# Patient Record
Sex: MALE | Race: BLACK OR AFRICAN AMERICAN | Employment: FULL TIME | URBAN - METROPOLITAN AREA
[De-identification: names, ages, dates, MRNs, and addresses within clinical notes are randomized per-mention and may not be internally consistent; named-entity substitution may affect disease eponyms.]

---

## 2024-04-30 ENCOUNTER — APPOINTMENT (OUTPATIENT)
Dept: GENERAL RADIOLOGY | Age: 39
End: 2024-04-30
Payer: MEDICAID

## 2024-04-30 ENCOUNTER — HOSPITAL ENCOUNTER (EMERGENCY)
Age: 39
Discharge: HOME | End: 2024-04-30
Attending: EMERGENCY MEDICINE
Payer: MEDICAID

## 2024-04-30 VITALS
HEART RATE: 73 BPM | SYSTOLIC BLOOD PRESSURE: 114 MMHG | RESPIRATION RATE: 18 BRPM | OXYGEN SATURATION: 97 % | DIASTOLIC BLOOD PRESSURE: 73 MMHG | TEMPERATURE: 98 F | WEIGHT: 190.92 LBS

## 2024-04-30 DIAGNOSIS — J02.0 STREP PHARYNGITIS: Primary | ICD-10-CM

## 2024-04-30 LAB
SPECIMEN SOURCE: ABNORMAL
STREP A, MOLECULAR: POSITIVE

## 2024-04-30 PROCEDURE — 71046 X-RAY EXAM CHEST 2 VIEWS: CPT

## 2024-04-30 PROCEDURE — 6370000000 HC RX 637 (ALT 250 FOR IP): Performed by: STUDENT IN AN ORGANIZED HEALTH CARE EDUCATION/TRAINING PROGRAM

## 2024-04-30 PROCEDURE — 87651 STREP A DNA AMP PROBE: CPT

## 2024-04-30 PROCEDURE — 99284 EMERGENCY DEPT VISIT MOD MDM: CPT

## 2024-04-30 RX ORDER — GUAIFENESIN 200 MG/10ML
400 LIQUID ORAL ONCE
Status: DISCONTINUED | OUTPATIENT
Start: 2024-04-30 | End: 2024-04-30

## 2024-04-30 RX ORDER — PENICILLIN V POTASSIUM 500 MG/1
500 TABLET ORAL 2 TIMES DAILY
Qty: 20 TABLET | Refills: 0 | Status: SHIPPED | OUTPATIENT
Start: 2024-04-30 | End: 2024-05-10

## 2024-04-30 RX ORDER — GUAIFENESIN 600 MG/1
600 TABLET, EXTENDED RELEASE ORAL 2 TIMES DAILY PRN
Qty: 10 TABLET | Refills: 0 | Status: SHIPPED | OUTPATIENT
Start: 2024-04-30 | End: 2024-05-05

## 2024-04-30 RX ORDER — ACETAMINOPHEN 500 MG
1000 TABLET ORAL EVERY 8 HOURS PRN
Qty: 30 TABLET | Refills: 0 | Status: SHIPPED | OUTPATIENT
Start: 2024-04-30 | End: 2024-05-11

## 2024-04-30 RX ORDER — IBUPROFEN 800 MG/1
800 TABLET ORAL EVERY 8 HOURS PRN
Qty: 60 TABLET | Refills: 0 | Status: SHIPPED | OUTPATIENT
Start: 2024-04-30 | End: 2024-05-11

## 2024-04-30 RX ORDER — IBUPROFEN 800 MG/1
800 TABLET ORAL
Status: COMPLETED | OUTPATIENT
Start: 2024-04-30 | End: 2024-04-30

## 2024-04-30 RX ORDER — PENICILLIN V POTASSIUM 250 MG/1
500 TABLET ORAL ONCE
Status: COMPLETED | OUTPATIENT
Start: 2024-04-30 | End: 2024-04-30

## 2024-04-30 RX ORDER — GUAIFENESIN 600 MG/1
600 TABLET, EXTENDED RELEASE ORAL ONCE
Status: COMPLETED | OUTPATIENT
Start: 2024-04-30 | End: 2024-04-30

## 2024-04-30 RX ADMIN — IBUPROFEN 800 MG: 800 TABLET, FILM COATED ORAL at 10:17

## 2024-04-30 RX ADMIN — GUAIFENESIN 600 MG: 600 TABLET, EXTENDED RELEASE ORAL at 10:44

## 2024-04-30 RX ADMIN — PENICILLIN V POTASSIUM 500 MG: 250 TABLET ORAL at 10:43

## 2024-04-30 ASSESSMENT — PAIN SCALES - GENERAL: PAINLEVEL_OUTOF10: 8

## 2024-04-30 ASSESSMENT — PAIN DESCRIPTION - LOCATION: LOCATION: THROAT

## 2024-04-30 ASSESSMENT — PAIN DESCRIPTION - DESCRIPTORS: DESCRIPTORS: ACHING;SORE

## 2024-04-30 NOTE — ED PROVIDER NOTES
[facility] ED  Emergency Department Encounter  Emergency Medicine Resident     Pt Name:Luis E Patel  MRN: [id number]  Birthdate 1985  Date of evaluation: 4/30/24  PCP:  No primary care provider on file.  Note Started: 9:18 AM EDT      CHIEF COMPLAINT       Chief Complaint   Patient presents with    Pharyngitis       HISTORY OF PRESENT ILLNESS  (Location/Symptom, Timing/Onset, Context/Setting, Quality, Duration, Modifying Factors, Severity.)      Luis E Patel is a 38 y.o. male who presents with sore throat and persistent cough.  Patient reports he was sick about 2 weeks ago and he did get better but notes that he is feeling ill once again.  He reports that he is having persistent cough productive of minimal sputum.  He denies hemoptysis.  He reports that he has been having sore throat that is improved by Motrin.  He reports his children were sick about a week ago and got better but he is still experiencing symptoms.  He denies nausea, vomiting.  He reports some generalized discomfort in his chest and abdomen due to the cough.  He reports that he has been trying to get into see a primary care physician but has not been able to see one yet.  He reports he received all of his childhood immunizations.  He additionally reports that he has been perceptively febrile.  He has not had a measured temp at home.  History obtained via .    PAST MEDICAL / SURGICAL / SOCIAL / FAMILY HISTORY      has no past medical history on file.     has no past surgical history on file.    Social History     Socioeconomic History    Marital status:      Spouse name: Not on file    Number of children: Not on file    Years of education: Not on file    Highest education level: Not on file   Occupational History    Not on file   Tobacco Use    Smoking status: Not on file    Smokeless tobacco: Not on file   Substance and Sexual Activity    Alcohol use: Not on file    Drug use: Not on file    Sexual activity:  acute process.     IMPRESSION:  No acute process.      [HO]      ED Course User Index  [HO] Nargis Caro MD       PROCEDURES:  none    CONSULTS:  None    CRITICAL CARE:  There was significant risk of life threatening deterioration of patient's condition requiring my direct management. Critical care time 0 minutes, excluding any documented procedures.    FINAL IMPRESSION      1. Strep pharyngitis          DISPOSITION / PLAN     DISPOSITION Decision To Discharge 04/30/2024 11:03:38 AM      PATIENT REFERRED TO:  No follow-up provider specified.    DISCHARGE MEDICATIONS:  Discharge Medication List as of 4/30/2024 11:04 AM        START taking these medications    Details   penicillin v potassium (VEETID) 500 MG tablet Take 1 tablet by mouth in the morning and at bedtime for 10 days, Disp-20 tablet, R-0Print      acetaminophen (TYLENOL) 500 MG tablet Take 2 tablets by mouth every 8 hours as needed for Pain or Fever, Disp-30 tablet, R-0Print      ibuprofen (ADVIL;MOTRIN) 800 MG tablet Take 1 tablet by mouth every 8 hours as needed for Pain or Fever, Disp-60 tablet, R-0Print      guaiFENesin (MUCINEX) 600 MG extended release tablet Take 1 tablet by mouth 2 times daily as needed for Congestion, Disp-10 tablet, R-0Print             Nargis Caro MD  Emergency Medicine Resident    (Please note that portions of thisnote were completed with a voice recognition program.  Efforts were made to edit the dictations but occasionally words are mis-transcribed.)

## 2024-04-30 NOTE — DISCHARGE INSTRUCTIONS
You were seen due to sore throat.  You are found to be positive for strep which is likely the cause of your sore throat.  We have prescribed you an antibiotic that we advise you take for the entire 10 days it is prescribed.  Please do not stop early.  You may continue to take Tylenol and Motrin as needed for your pain.  You may also take Robitussin as needed for your cough.    We recommend that you follow-up with your primary care physician in the next 2 weeks.    Please return to the ED if you develop worsening shortness of breath, difficulty swallowing, difficulty breathing, chest pain, abdominal pain, fever, chills, or for any other concern.

## 2024-04-30 NOTE — ED NOTES
Pt presents to ED via walking through triage c/o cough and pharyngitis x1 week. Pt reports dry cough and denies coughing up any phlegm. Pt denies any PCP. Pt reports that they were provided with resources through medicare for PCP, but never followed up. Pt reports taking ibuprofen for pain on Friday. Pt reports relief following ibuprofen on Friday. Pt is afebrile today. Pt denies taking any medication today for pain. Pt reports children at home have had a cold.

## 2024-05-01 NOTE — ED PROVIDER NOTES
[facility]     Emergency Department     Faculty Note/ Attestation      Pt Name: Luis E Patel                                       MRN: [id number]  Birthdate 1985  Date of evaluation: 4/30/2024    Patients PCP:    No primary care provider on file.    Note Started: 7:27 PM EDT      Attestation  I performed a history and physical examination of the patient and discussed management with the resident. I reviewed the resident’s note and agree with the documented findings and plan of care. Any areas of disagreement are noted on the chart. I was personally present for the key portions of any procedures. I have documented in the chart those procedures where I was not present during the key portions. I have reviewed the emergency nurses triage note. I agree with the chief complaint, past medical history, past surgical history, allergies, medications, social and family history as documented unless otherwise noted below.    For Physician Assistant/ Nurse Practitioner cases/documentation I have personally evaluated this patient and have completed at least one if not all key elements of the E/M (history, physical exam, and MDM). Additional findings are as noted.      Initial Screens:        George Coma Scale  Eye Opening: Spontaneous  Best Verbal Response: Oriented  Best Motor Response: Obeys commands  George Coma Scale Score: 15    Vitals:    Vitals:    04/30/24 0923   BP: 114/73   Pulse: 73   Resp: 18   Temp: 98 °F (36.7 °C)   TempSrc: Oral   SpO2: 97%   Weight: 86.6 kg (190 lb 14.7 oz)       CHIEF COMPLAINT       Chief Complaint   Patient presents with    Pharyngitis             DIAGNOSTIC RESULTS             RADIOLOGY:   XR CHEST (2 VW)   Final Result   No acute process.               LABS:  Labs Reviewed   RAPID STREP SCREEN - Abnormal; Notable for the following components:       Result Value    Strep A, Molecular POSITIVE (*)     All other components within normal limits         EMERGENCY

## 2024-05-11 ENCOUNTER — HOSPITAL ENCOUNTER (EMERGENCY)
Age: 39
Discharge: HOME | End: 2024-05-11
Attending: EMERGENCY MEDICINE
Payer: MEDICAID

## 2024-05-11 VITALS
DIASTOLIC BLOOD PRESSURE: 83 MMHG | WEIGHT: 181 LBS | BODY MASS INDEX: 28.41 KG/M2 | HEART RATE: 75 BPM | SYSTOLIC BLOOD PRESSURE: 124 MMHG | OXYGEN SATURATION: 99 % | TEMPERATURE: 98.4 F | RESPIRATION RATE: 20 BRPM | HEIGHT: 67 IN

## 2024-05-11 DIAGNOSIS — S39.012A STRAIN OF LUMBAR REGION, INITIAL ENCOUNTER: Primary | ICD-10-CM

## 2024-05-11 LAB
BACTERIA URNS QL MICRO: NORMAL
BILIRUB UR QL STRIP: NEGATIVE
CASTS #/AREA URNS LPF: NORMAL /LPF (ref 0–8)
CLARITY UR: CLEAR
COLOR UR: YELLOW
EPI CELLS #/AREA URNS HPF: NORMAL /HPF (ref 0–5)
GLUCOSE UR STRIP-MCNC: NEGATIVE MG/DL
HGB UR QL STRIP.AUTO: NEGATIVE
KETONES UR STRIP-MCNC: NEGATIVE MG/DL
LEUKOCYTE ESTERASE UR QL STRIP: NEGATIVE
NITRITE UR QL STRIP: NEGATIVE
PH UR STRIP: 6.5 [PH] (ref 5–8)
PROT UR STRIP-MCNC: NEGATIVE MG/DL
RBC #/AREA URNS HPF: NORMAL /HPF (ref 0–4)
SP GR UR STRIP: 1.01 (ref 1–1.03)
UROBILINOGEN UR STRIP-ACNC: NORMAL EU/DL (ref 0–1)
WBC #/AREA URNS HPF: NORMAL /HPF (ref 0–5)

## 2024-05-11 PROCEDURE — 81001 URINALYSIS AUTO W/SCOPE: CPT

## 2024-05-11 PROCEDURE — 6370000000 HC RX 637 (ALT 250 FOR IP)

## 2024-05-11 PROCEDURE — 6360000002 HC RX W HCPCS

## 2024-05-11 PROCEDURE — 96372 THER/PROPH/DIAG INJ SC/IM: CPT | Performed by: EMERGENCY MEDICINE

## 2024-05-11 PROCEDURE — 99284 EMERGENCY DEPT VISIT MOD MDM: CPT | Performed by: EMERGENCY MEDICINE

## 2024-05-11 RX ORDER — CYCLOBENZAPRINE HCL 5 MG
5 TABLET ORAL NIGHTLY PRN
Qty: 3 TABLET | Refills: 0 | Status: SHIPPED | OUTPATIENT
Start: 2024-05-11 | End: 2024-05-14

## 2024-05-11 RX ORDER — IBUPROFEN 800 MG/1
800 TABLET ORAL EVERY 8 HOURS PRN
Qty: 60 TABLET | Refills: 0 | Status: SHIPPED | OUTPATIENT
Start: 2024-05-11

## 2024-05-11 RX ORDER — ACETAMINOPHEN 500 MG
1000 TABLET ORAL EVERY 8 HOURS PRN
Qty: 30 TABLET | Refills: 0 | Status: SHIPPED | OUTPATIENT
Start: 2024-05-11

## 2024-05-11 RX ORDER — ACETAMINOPHEN 500 MG
1000 TABLET ORAL ONCE
Status: COMPLETED | OUTPATIENT
Start: 2024-05-11 | End: 2024-05-11

## 2024-05-11 RX ORDER — KETOROLAC TROMETHAMINE 30 MG/ML
30 INJECTION, SOLUTION INTRAMUSCULAR; INTRAVENOUS ONCE
Status: COMPLETED | OUTPATIENT
Start: 2024-05-11 | End: 2024-05-11

## 2024-05-11 RX ADMIN — ACETAMINOPHEN 1000 MG: 500 TABLET ORAL at 02:00

## 2024-05-11 RX ADMIN — KETOROLAC TROMETHAMINE 30 MG: 30 INJECTION, SOLUTION INTRAMUSCULAR; INTRAVENOUS at 02:01

## 2024-05-11 ASSESSMENT — PAIN DESCRIPTION - LOCATION: LOCATION: BACK

## 2024-05-11 ASSESSMENT — ENCOUNTER SYMPTOMS
VOMITING: 0
NAUSEA: 0
BACK PAIN: 1
CONSTIPATION: 0
ABDOMINAL PAIN: 0

## 2024-05-11 ASSESSMENT — PAIN SCALES - GENERAL: PAINLEVEL_OUTOF10: 10

## 2024-05-11 ASSESSMENT — PAIN - FUNCTIONAL ASSESSMENT: PAIN_FUNCTIONAL_ASSESSMENT: 0-10

## 2024-05-11 NOTE — DISCHARGE INSTRUCTIONS
You were seen in the emergency department for lower back pain that started yesterday.  Your vital signs were stable.  No fever noted.  Urine did not show any blood to signify a kidney stone.  There was no urinary tract infection.    Your pain improved after Toradol and Tylenol.  This is likely a lower back strain.  You may continue to take Tylenol and ibuprofen as needed for pain.  I have also sent muscle relaxants to the pharmacy.  Please only take these before bed as they can make you drowsy.  Please do not drive or operate machinery after taking the Flexeril.    Please follow-up with your primary care provider.  If you do not have a primary care provider, I have provided the contact information for [facility].  Please call and schedule an appointment to establish care.    Please return to the emergency department if your pain returns or worsens or you develop any numbness or weakness in your lower extremities or any other concerns.

## 2024-05-11 NOTE — ED PROVIDER NOTES
[facility] ED  Emergency Department Encounter  Emergency Medicine Resident     Pt Name:Luis E Patel  MRN: [id number]  Birthdate 1985  Date of evaluation: 5/11/24  PCP:  No primary care provider on file.  Note Started: 1:14 AM EDT      CHIEF COMPLAINT       Chief Complaint   Patient presents with    Back Pain       HISTORY OF PRESENT ILLNESS  (Location/Symptom, Timing/Onset, Context/Setting, Quality, Duration, Modifying Factors, Severity.)      Luis E Patel is a 38 y.o. male who presents with lower back pain that patient states is \"on the inside\".  Patient states this started yesterday and has been constant.  He has had pain like this in the past back in 2015.  Patient denies lifting anything heavy to cause the pain.  It appears that started out of nowhere.  He denies any numbness or weakness in his lower extremities.  He denies any saddle anesthesia.  Denies any difficulty with urination or having bowel movements.  He denies any dysuria or hematuria.  Denies any fevers or chills.  He has been taking ibuprofen for the pain but it has not helped.  The pain is worse with sitting and better with standing up.  He denies any abdominal pain, nausea, or vomiting.  History was taken with a .    PAST MEDICAL / SURGICAL / SOCIAL / FAMILY HISTORY      has no past medical history on file.     has no past surgical history on file.    Social History     Socioeconomic History    Marital status:      Spouse name: Not on file    Number of children: Not on file    Years of education: Not on file    Highest education level: Not on file   Occupational History    Not on file   Tobacco Use    Smoking status: Not on file    Smokeless tobacco: Not on file   Substance and Sexual Activity    Alcohol use: Not on file    Drug use: Not on file    Sexual activity: Not on file   Other Topics Concern    Not on file   Social History Narrative    Not on file     Social Determinants of Health     Financial  pulses.      Comments: DP pulses 2+ bilaterally  Pulmonary:      Effort: Pulmonary effort is normal.   Abdominal:      General: Abdomen is flat. There is no distension.      Palpations: Abdomen is soft.      Tenderness: There is no abdominal tenderness. There is no guarding or rebound.   Musculoskeletal:         General: No swelling or tenderness. Normal range of motion.      Cervical back: Normal range of motion.      Comments: No midline or paraspinal lumbar tenderness   Skin:     Findings: No bruising or lesion.   Neurological:      General: No focal deficit present.      Mental Status: He is alert and oriented to person, place, and time.      Cranial Nerves: No cranial nerve deficit.      Sensory: No sensory deficit.      Motor: No weakness.      Coordination: Coordination normal.       DDX/DIAGNOSTIC RESULTS / EMERGENCY DEPARTMENT COURSE / MDM     Medical Decision Making  38-year-old male who presents with lower back pain that patient states is \"on the inside\".  Patient states this started yesterday and has been constant.  He has had pain like this in the past back in 2015.  Patient denies lifting anything heavy to cause the pain.  It appears that started out of nowhere.  He denies any numbness or weakness in his lower extremities.  He denies any saddle anesthesia.  Denies any difficulty with urination or having bowel movements.  He denies any dysuria or hematuria.  Denies any fevers or chills.  He has been taking ibuprofen for the pain but it has not helped.  The pain is worse with sitting and better with standing up.  He denies any abdominal pain, nausea, or vomiting.  History was taken with a .  Patient looks very uncomfortable.  He is seen standing and when going to sit is when seen.  Vital signs are stable.  No fever noted.  Abdomen soft, nontender, nondistended.  No rebound or guarding.  No midline lumbar spine tenderness.  No tenderness to bilateral paraspinal regions.  Strength 5/5 in

## 2024-05-11 NOTE — ED PROVIDER NOTES
[facility]   Emergency Department  Faculty Attestation       I performed a history and physical examination of the patient and discussed management with the resident. I reviewed the resident’s note and agree with the documented findings including all diagnostic interpretations and plan of care. Any areas of disagreement are noted on the chart. I was personally present for the key portions of any procedures. I have documented in the chart those procedures where I was not present during the key portions. I have reviewed the emergency nurses triage note. I agree with the chief complaint, past medical history, past surgical history, allergies, medications, social and family history as documented unless otherwise noted below.  For Physician Assistant/ Nurse Practitioner cases/documentation I have personally evaluated this patient and have completed at least one if not all key elements of the E/M (history, physical exam, and MDM). Additional findings are as noted.    Patient Name: Luis E Patel  MRN: [id number]  : 1985  Primary Care Physician: No primary care provider on file.    Date of evaluationa: 2024   Note Started: 5:40 PM EDT    Pertinent Comments     Chief Complaint:   Chief Complaint   Patient presents with    Back Pain        Initial vitals: (If not listed, please see nursing documentation)  ED Triage Vitals [24 0021]   BP Temp Temp Source Pulse Respirations SpO2 Height Weight - Scale   124/83 98.4 °F (36.9 °C) Oral 75 20 99 % 1.702 m (5' 7\") 82.1 kg (181 lb)      History and exam were obtained using .  Myself and resident evaluated the patient at the same time and I directly observed the exam.    HPI/PE/Impression:  This is a 38 y.o. male who presents to the Emergency Department w/ left lower back pain that feels like it is \"deep inside\" that started yesterday. Contant, nonradiating. No inciting event. No numbness or weknaess. No urinary or bowel changes. No falls.

## 2024-05-17 ENCOUNTER — HOSPITAL ENCOUNTER (EMERGENCY)
Age: 39
Discharge: HOME | End: 2024-05-17
Attending: EMERGENCY MEDICINE
Payer: MEDICAID

## 2024-05-17 ENCOUNTER — APPOINTMENT (OUTPATIENT)
Dept: CT IMAGING | Age: 39
End: 2024-05-17
Payer: MEDICAID

## 2024-05-17 ENCOUNTER — APPOINTMENT (OUTPATIENT)
Dept: MRI IMAGING | Age: 39
End: 2024-05-17
Payer: MEDICAID

## 2024-05-17 VITALS
OXYGEN SATURATION: 98 % | RESPIRATION RATE: 16 BRPM | SYSTOLIC BLOOD PRESSURE: 107 MMHG | TEMPERATURE: 97.3 F | HEART RATE: 71 BPM | DIASTOLIC BLOOD PRESSURE: 78 MMHG

## 2024-05-17 DIAGNOSIS — S39.012A STRAIN OF LUMBAR REGION, INITIAL ENCOUNTER: Primary | ICD-10-CM

## 2024-05-17 PROCEDURE — 72128 CT CHEST SPINE W/O DYE: CPT

## 2024-05-17 PROCEDURE — 72148 MRI LUMBAR SPINE W/O DYE: CPT

## 2024-05-17 PROCEDURE — 96372 THER/PROPH/DIAG INJ SC/IM: CPT

## 2024-05-17 PROCEDURE — 72131 CT LUMBAR SPINE W/O DYE: CPT

## 2024-05-17 PROCEDURE — 6360000002 HC RX W HCPCS: Performed by: STUDENT IN AN ORGANIZED HEALTH CARE EDUCATION/TRAINING PROGRAM

## 2024-05-17 PROCEDURE — 99284 EMERGENCY DEPT VISIT MOD MDM: CPT

## 2024-05-17 RX ORDER — TIZANIDINE 2 MG/1
2 TABLET ORAL 3 TIMES DAILY PRN
Qty: 10 TABLET | Refills: 0 | Status: SHIPPED | OUTPATIENT
Start: 2024-05-17 | End: 2024-05-17

## 2024-05-17 RX ORDER — METHYLPREDNISOLONE 4 MG/1
TABLET ORAL
Qty: 1 KIT | Refills: 0 | Status: SHIPPED | OUTPATIENT
Start: 2024-05-17 | End: 2024-05-17

## 2024-05-17 RX ORDER — METHYLPREDNISOLONE 4 MG/1
TABLET ORAL
Qty: 1 KIT | Refills: 0 | Status: SHIPPED | OUTPATIENT
Start: 2024-05-17 | End: 2024-05-23 | Stop reason: ALTCHOICE

## 2024-05-17 RX ORDER — ORPHENADRINE CITRATE 30 MG/ML
60 INJECTION INTRAMUSCULAR; INTRAVENOUS ONCE
Status: COMPLETED | OUTPATIENT
Start: 2024-05-17 | End: 2024-05-17

## 2024-05-17 RX ORDER — TIZANIDINE 2 MG/1
2 TABLET ORAL 3 TIMES DAILY PRN
Qty: 10 TABLET | Refills: 0 | Status: SHIPPED | OUTPATIENT
Start: 2024-05-17 | End: 2024-05-22

## 2024-05-17 RX ADMIN — ORPHENADRINE CITRATE 60 MG: 30 INJECTION, SOLUTION INTRAMUSCULAR; INTRAVENOUS at 10:01

## 2024-05-17 ASSESSMENT — ENCOUNTER SYMPTOMS
SHORTNESS OF BREATH: 0
VOMITING: 0
BACK PAIN: 1

## 2024-05-17 NOTE — ED PROVIDER NOTES
[facility] ED  Emergency Department  Emergency Medicine Sign-out     Care of Luis E Patel was assumed from Dr. Saba and is being seen for Back Pain  .  The patient's initial evaluation and plan have been discussed with the prior attending who initially evaluated the patient.     EMERGENCY DEPARTMENT COURSE / MEDICAL DECISION MAKING:       MEDICATIONS GIVEN:  Orders Placed This Encounter   Medications    orphenadrine (NORFLEX) injection 60 mg    DISCONTD: methylPREDNISolone (MEDROL DOSEPACK) 4 MG tablet     Sig: Take by mouth.     Dispense:  1 kit     Refill:  0    DISCONTD: tiZANidine (ZANAFLEX) 2 MG tablet     Sig: Take 1 tablet by mouth 3 times daily as needed (back/hip pain)     Dispense:  10 tablet     Refill:  0    methylPREDNISolone (MEDROL DOSEPACK) 4 MG tablet     Sig: Take by mouth.     Dispense:  1 kit     Refill:  0    tiZANidine (ZANAFLEX) 2 MG tablet     Sig: Take 1 tablet by mouth 3 times daily as needed (back/hip pain)     Dispense:  10 tablet     Refill:  0       LABS / RADIOLOGY:     Labs Reviewed - No data to display    MRI LUMBAR SPINE WO CONTRAST    Result Date: 5/17/2024  EXAMINATION: MRI OF THE LUMBAR SPINE WITHOUT CONTRAST, 5/17/2024 12:58 pm TECHNIQUE: Multiplanar multisequence MRI of the lumbar spine was performed without the administration of intravenous contrast. COMPARISON: Same day CT lumbar spine HISTORY: ORDERING SYSTEM PROVIDED HISTORY: severe lumbar back pain, CT showing disc bulge with encroachment on central canal TECHNOLOGIST PROVIDED HISTORY: severe lumbar back pain, CT showing disc bulge with encroachment on central canal Decision Support Exception - unselect if not a suspected or confirmed emergency medical condition->Emergency Medical Condition (MA) Reason for Exam: severe lumbar back pain, CT showing disc bulge with encroachment on central canal FINDINGS: BONES/ALIGNMENT: There is normal alignment of the spine. The vertebral body heights are

## 2024-05-17 NOTE — CONSULTS
at L4-L5 and L5-S1 with disc bulges at least mildly encroaching on the central canal at these levels.    MRI lumbar spine 5/17/14:Early degenerative changes of the lower lumbar spine at L4-L5 and L5-S1 without high-grade spinal canal or foraminal stenosis identified.    PAST MEDICAL HISTORY :       Past Medical History:    History reviewed. No pertinent past medical history.    Past Surgical History:    History reviewed. No pertinent surgical history.    Social History:   Social History     Socioeconomic History    Marital status:      Spouse name: Not on file    Number of children: Not on file    Years of education: Not on file    Highest education level: Not on file   Occupational History    Not on file   Tobacco Use    Smoking status: Never    Smokeless tobacco: Never   Substance and Sexual Activity    Alcohol use: Never    Drug use: Never    Sexual activity: Not on file   Other Topics Concern    Not on file   Social History Narrative    Not on file     Social Determinants of Health     Financial Resource Strain: Not on file   Food Insecurity: Not on file   Transportation Needs: Not on file   Physical Activity: Not on file   Stress: Not on file   Social Connections: Not on file   Intimate Partner Violence: Not on file   Housing Stability: Not on file       Family History:   History reviewed. No pertinent family history.    Allergies:   Patient has no known allergies.    Home Medications:  Prior to Admission medications    Medication Sig Start Date End Date Taking? Authorizing Provider   acetaminophen (TYLENOL) 500 MG tablet Take 2 tablets by mouth every 8 hours as needed for Pain or Fever 5/11/24   Paul Mathew MD   ibuprofen (ADVIL;MOTRIN) 800 MG tablet Take 1 tablet by mouth every 8 hours as needed for Pain or Fever 5/11/24   Paul Mathew MD       Current Medications:   No current facility-administered medications for this encounter.    REVIEW OF SYSTEMS:       General ROS - No fevers, no  right renal stone. LUMBAR: BONES/ALIGNMENT: There is normal alignment of the spine. The vertebral body heights are maintained. No osseous destructive lesion is seen. DEGENERATIVE CHANGES: Disc disease is notable at L4-5 and L5-S1 with disc bulges at least mildly encroaching on the central canal at these levels.  No significant facet arthropathy. SOFT TISSUES: Visualized soft tissues of the abdomen and pelvis reveal no acute findings. * Please note that the spinal canal contents are not adequately assessed on this exam. If further evaluation of the spinal canal, spinal cord or nerve roots is necessary, MRI is recommended.     1. No acute osseous abnormality of the thoracic spine. 2. No acute osseous abnormality of the lumbar spine. 3. Disc disease is notable at L4-5 and L5-S1 with disc bulges at least mildly encroaching on the central canal at these levels. If further evaluation of the spinal canal, spinal cord or nerve roots is necessary, MRI is recommended. 4. Punctate right renal stone.     XR CHEST (2 VW)    Result Date: 4/30/2024  EXAMINATION: TWO XRAY VIEWS OF THE CHEST 4/30/2024 10:14 am COMPARISON: None. HISTORY: ORDERING SYSTEM PROVIDED HISTORY: Persistent cough TECHNOLOGIST PROVIDED HISTORY: Persistent cough FINDINGS: The lungs are without acute focal process.  There is no effusion or pneumothorax. The cardiomediastinal silhouette is without acute process. The osseous structures are without acute process.     No acute process.      ASSESSMENT AND PLAN:     There is no problem list on file for this patient.      A/P:   38-year-old male who presents with bilateral hip pain, right more than left.  He reports he was bending down to pick something up 8 days ago when he heard a pop in his back, followed by sudden onset bilateral hip pain right more than left.   CT lumbar, thoracic spine is significant for disc disease notable at L4-L5 and L5-S1 with disc bulges at least mildly encroaching on the central canal at

## 2024-05-17 NOTE — DISCHARGE INSTRUCTIONS
Ou gen protrusions disk lonbèr ak tansyon nan misk ki ta ka lakòz doulè ou.  Pran pake Medrol Dòz la walter yo preskri a grace lucia ak doulè. Ou ka pran Zanaflex rosalino, yon relaxer nan misk grace lucia ak spasm nan misk. Sa ka fè ou anvi dòmi ak dòmi.  [facility] nan yon semèn grace re-evalyasyon.    Retounen si ou gen pwoblèm grace mache, brittney subramanianin, feblès janm, mauricio, oswa nenpòt lòt sentòm ki michelle.

## 2024-05-17 NOTE — ED PROVIDER NOTES
[facility]     Emergency Department     Faculty Attestation    I performed a history and physical examination of the patient and discussed management with the resident. I reviewed the resident´s note and agree with the documented findings and plan of care. Any areas of disagreement are noted on the chart. I was personally present for the key portions of any procedures. I have documented in the chart those procedures where I was not present during the key portions. I have reviewed the emergency nurses triage note. I agree with the chief complaint, past medical history, past surgical history, allergies, medications, social and family history as documented unless otherwise noted below. For Physician Assistant/ Nurse Practitioner cases/documentation I have personally evaluated this patient and have completed at least one if not all key elements of the E/M (history, physical exam, and MDM). Additional findings are as noted.       Sathish Ruelas MD  05/17/24 4738

## 2024-05-17 NOTE — ED NOTES
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 Requesting anxiety meds, Xanax sent to pharmacy.    Pt to ED with c/o lower back radiating down R leg. Pt denies injury stating if he twists wrong the pain \"makes him feel paralyzed.\"

## 2024-05-17 NOTE — ED PROVIDER NOTES
[facility] ED  Emergency Department Encounter  Emergency Medicine Resident     Pt Name:Luis E Patel  MRN: [id number]  Birthdate 1985  Date of evaluation: 5/17/24  PCP:  No primary care provider on file.  Note Started: 9:34 AM EDT    CHIEF COMPLAINT       Chief Complaint   Patient presents with    Back Pain       HISTORY OF PRESENT ILLNESS  (Location/Symptom, Timing/Onset, Context/Setting, Quality, Duration, Modifying Factors, Severity.)      Luis E Patel is a 38 y.o. male who presents with lower back pain. He states he was bending over the other day when he felt a 'pop' in his lower back and has had significant pain in his lower back and radiating down his right lower extremity since then. He has been able to ambulate but with significant pain. He denies fevers, chills, saddle anesthesia, weakness, urinary incontinence or retention, fecal incontinence or retention, history of IVDU. All communication held via video  in creole.    PAST MEDICAL / SURGICAL / SOCIAL / FAMILY HISTORY      has no past medical history on file.       has no past surgical history on file.      Social History     Socioeconomic History    Marital status:      Spouse name: Not on file    Number of children: Not on file    Years of education: Not on file    Highest education level: Not on file   Occupational History    Not on file   Tobacco Use    Smoking status: Never    Smokeless tobacco: Never   Substance and Sexual Activity    Alcohol use: Never    Drug use: Never    Sexual activity: Not on file   Other Topics Concern    Not on file   Social History Narrative    Not on file     Social Determinants of Health     Financial Resource Strain: Not on file   Food Insecurity: Not on file   Transportation Needs: Not on file   Physical Activity: Not on file   Stress: Not on file   Social Connections: Not on file   Intimate Partner Violence: Not on file   Housing Stability: Not on file       History reviewed.  TO:  [facility]  [address] [id number] [phone]  Schedule an appointment as soon as possible for a visit in 1 week  for back pain re-evaluation    [facility] ED  [address] [id number] [phone]    If symptoms worsen      DISCHARGE MEDICATIONS:  Discharge Medication List as of 5/17/2024  3:43 PM          Tuyet Saba MD  Emergency Medicine Resident    (Please note that portions of this note were completed with a voice recognition program.  Efforts were made to edit the dictations but occasionally words are mis-transcribed.)

## 2024-05-20 ENCOUNTER — TELEPHONE (OUTPATIENT)
Dept: INTERNAL MEDICINE | Age: 39
End: 2024-05-20

## 2024-05-20 NOTE — TELEPHONE ENCOUNTER
Pt came to window visibly in pain, thought his new patient appt was here in office yesterday, but reminder card is for June, not May. Writer utilized  service to discuss with pt a sooner appt for him to establish care, pt agreeable.

## 2024-05-23 ENCOUNTER — OFFICE VISIT (OUTPATIENT)
Dept: INTERNAL MEDICINE | Age: 39
End: 2024-05-23
Payer: MEDICAID

## 2024-05-23 VITALS
TEMPERATURE: 97.9 F | SYSTOLIC BLOOD PRESSURE: 125 MMHG | WEIGHT: 190.2 LBS | HEIGHT: 67 IN | DIASTOLIC BLOOD PRESSURE: 81 MMHG | OXYGEN SATURATION: 98 % | HEART RATE: 70 BPM | BODY MASS INDEX: 29.85 KG/M2

## 2024-05-23 DIAGNOSIS — Z13.31 DEPRESSION SCREENING NEGATIVE: ICD-10-CM

## 2024-05-23 DIAGNOSIS — Z11.4 SCREENING FOR HIV (HUMAN IMMUNODEFICIENCY VIRUS): ICD-10-CM

## 2024-05-23 DIAGNOSIS — M51.37 DDD (DEGENERATIVE DISC DISEASE), LUMBOSACRAL: Primary | ICD-10-CM

## 2024-05-23 DIAGNOSIS — M54.50 RIGHT-SIDED LOW BACK PAIN WITHOUT SCIATICA, UNSPECIFIED CHRONICITY: ICD-10-CM

## 2024-05-23 DIAGNOSIS — Z11.59 NEED FOR HEPATITIS C SCREENING TEST: ICD-10-CM

## 2024-05-23 PROBLEM — M51.379 DDD (DEGENERATIVE DISC DISEASE), LUMBOSACRAL: Status: ACTIVE | Noted: 2024-05-23

## 2024-05-23 PROCEDURE — 99213 OFFICE O/P EST LOW 20 MIN: CPT

## 2024-05-23 RX ORDER — IBUPROFEN 800 MG/1
800 TABLET ORAL EVERY 8 HOURS PRN
Qty: 60 TABLET | Refills: 0 | Status: SHIPPED | OUTPATIENT
Start: 2024-05-23

## 2024-05-23 RX ORDER — ACETAMINOPHEN 500 MG
1000 TABLET ORAL EVERY 8 HOURS PRN
Qty: 30 TABLET | Refills: 0 | Status: SHIPPED | OUTPATIENT
Start: 2024-05-23

## 2024-05-23 SDOH — ECONOMIC STABILITY: HOUSING INSECURITY
IN THE LAST 12 MONTHS, WAS THERE A TIME WHEN YOU DID NOT HAVE A STEADY PLACE TO SLEEP OR SLEPT IN A SHELTER (INCLUDING NOW)?: NO

## 2024-05-23 SDOH — ECONOMIC STABILITY: FOOD INSECURITY: WITHIN THE PAST 12 MONTHS, YOU WORRIED THAT YOUR FOOD WOULD RUN OUT BEFORE YOU GOT MONEY TO BUY MORE.: NEVER TRUE

## 2024-05-23 SDOH — ECONOMIC STABILITY: FOOD INSECURITY: WITHIN THE PAST 12 MONTHS, THE FOOD YOU BOUGHT JUST DIDN'T LAST AND YOU DIDN'T HAVE MONEY TO GET MORE.: NEVER TRUE

## 2024-05-23 SDOH — ECONOMIC STABILITY: INCOME INSECURITY: HOW HARD IS IT FOR YOU TO PAY FOR THE VERY BASICS LIKE FOOD, HOUSING, MEDICAL CARE, AND HEATING?: NOT HARD AT ALL

## 2024-05-23 ASSESSMENT — PATIENT HEALTH QUESTIONNAIRE - PHQ9
SUM OF ALL RESPONSES TO PHQ QUESTIONS 1-9: 1
1. LITTLE INTEREST OR PLEASURE IN DOING THINGS: NOT AT ALL
2. FEELING DOWN, DEPRESSED OR HOPELESS: SEVERAL DAYS
SUM OF ALL RESPONSES TO PHQ QUESTIONS 1-9: 1
SUM OF ALL RESPONSES TO PHQ9 QUESTIONS 1 & 2: 1

## 2024-05-23 NOTE — PROGRESS NOTES
Attending Physician Statement  I have discussed the care of Luis E Patel, including pertinent history and exam findings with the resident. I have reviewed the key elements of all parts of the encounter with the resident. I have seen and examined the patient with the resident and the key elements of all parts of the encounter have been performed by me.  Added history includes here to establish care. Was seen in ED for back/flank pain. I agree with the assessment, and status of the problem list as documented.    Diagnosis Orders   1. DDD (degenerative disc disease), lumbosacral  ibuprofen (ADVIL;MOTRIN) 800 MG tablet    acetaminophen (TYLENOL) 500 MG tablet      2. Right-sided low back pain without sciatica, unspecified chronicity  ibuprofen (ADVIL;MOTRIN) 800 MG tablet    acetaminophen (TYLENOL) 500 MG tablet      3. Depression screening negative        4. Screening for HIV (human immunodeficiency virus)  HIV Screen      5. Need for hepatitis C screening test  Hepatitis C Antibody        The plan and orders should include No orders of the defined types were placed in this encounter.   and this was also documented by the resident. .The medication list was reviewed with the resident and is up to date. The return visit should be in 6 months but sooner if back pain does not resolve.    Dr Mirian Perez MD, FACP  Associate , Internal Medicine Residency Program  [facility]  Chair, Department of Internal Medicine  [facility]         5/23/2024, 9:56 AM        
Denies: back pain, joint pain  SKIN: Denies: rash, itching  ______________________________________________________________________  Physical Exam:  Vitals:    05/23/24 0933   BP: 125/81   Site: Right Upper Arm   Position: Sitting   Cuff Size: Large Adult   Pulse: 70   Temp: 97.9 °F (36.6 °C)   SpO2: 98%   Weight: 86.3 kg (190 lb 3.2 oz)   Height: 1.702 m (5' 7\")     BP Readings from Last 3 Encounters:   05/23/24 125/81   05/17/24 107/78   05/11/24 124/83      General appearance - alert, well appearing, and in no distress  Chest - clear to auscultation, no wheezes, rales or rhonchi, symmetric air entry  Heart - normal rate, regular rhythm, normal S1, S2, no murmurs, rubs, clicks or gallops  Back exam - full range of motion, no tenderness, palpable spasm or pain on motion  Musculoskeletal - no joint tenderness, deformity or swelling  Extremities - peripheral pulses normal, no pedal edema, no clubbing or cyanosis    ______________________________________________________________________  Diagnostic findings:  CBC:No results found for: \"WBC\", \"HGB\", \"PLT\"    BMP:  No results found for: \"NA\", \"K\", \"CL\", \"CO2\", \"BUN\", \"CREATININE\", \"GLUCOSE\"    HEMOGLOBIN A1C: No results found for: \"LABA1C\"    FASTING LIPID PANEL:No results found for: \"CHOL\", \"HDL\", \"TRIG\"  ______________________________________________________________________  Assessment and Plan:  Luis E was seen today for check-up, flank pain and cough.    Diagnoses and all orders for this visit:    DDD (degenerative disc disease), lumbosacral  Right-sided low back pain without sciatica, unspecified chronicity  Right-sided lower back pain without radiation or alarm symptoms.  No point tenderness.  -     ibuprofen (ADVIL;MOTRIN) 800 MG tablet; Take 1 tablet by mouth every 8 hours as needed for Pain or Fever  -     acetaminophen (TYLENOL) 500 MG tablet; Take 2 tablets by mouth every 8 hours as needed for Pain or Fever  -     Patient also counseled on doing lower back and

## 2025-03-17 ENCOUNTER — HOSPITAL ENCOUNTER (EMERGENCY)
Age: 40
Discharge: HOME | End: 2025-03-17
Attending: EMERGENCY MEDICINE
Payer: MEDICAID

## 2025-03-17 ENCOUNTER — APPOINTMENT (OUTPATIENT)
Dept: GENERAL RADIOLOGY | Age: 40
End: 2025-03-17
Payer: MEDICAID

## 2025-03-17 VITALS
BODY MASS INDEX: 29.9 KG/M2 | WEIGHT: 190.92 LBS | OXYGEN SATURATION: 98 % | DIASTOLIC BLOOD PRESSURE: 83 MMHG | TEMPERATURE: 97.9 F | RESPIRATION RATE: 18 BRPM | SYSTOLIC BLOOD PRESSURE: 113 MMHG | HEART RATE: 73 BPM

## 2025-03-17 DIAGNOSIS — M54.50 ACUTE EXACERBATION OF CHRONIC LOW BACK PAIN: Primary | ICD-10-CM

## 2025-03-17 DIAGNOSIS — G89.29 ACUTE EXACERBATION OF CHRONIC LOW BACK PAIN: Primary | ICD-10-CM

## 2025-03-17 PROCEDURE — 99284 EMERGENCY DEPT VISIT MOD MDM: CPT

## 2025-03-17 PROCEDURE — 6360000002 HC RX W HCPCS

## 2025-03-17 PROCEDURE — 96372 THER/PROPH/DIAG INJ SC/IM: CPT

## 2025-03-17 PROCEDURE — 72100 X-RAY EXAM L-S SPINE 2/3 VWS: CPT

## 2025-03-17 RX ORDER — ORPHENADRINE CITRATE 30 MG/ML
60 INJECTION INTRAMUSCULAR; INTRAVENOUS ONCE
Status: COMPLETED | OUTPATIENT
Start: 2025-03-17 | End: 2025-03-17

## 2025-03-17 RX ORDER — CYCLOBENZAPRINE HCL 5 MG
5 TABLET ORAL 2 TIMES DAILY PRN
Qty: 10 TABLET | Refills: 0 | Status: SHIPPED | OUTPATIENT
Start: 2025-03-17 | End: 2025-03-27

## 2025-03-17 RX ADMIN — ORPHENADRINE CITRATE 60 MG: 60 INJECTION INTRAMUSCULAR; INTRAVENOUS at 17:03

## 2025-03-17 ASSESSMENT — ENCOUNTER SYMPTOMS
SHORTNESS OF BREATH: 0
COUGH: 0
ABDOMINAL PAIN: 0
DIARRHEA: 0
NAUSEA: 0
BACK PAIN: 1
VOMITING: 0

## 2025-03-17 NOTE — ED PROVIDER NOTES
[facility]  Emergency Department Encounter  Emergency Medicine Resident     Pt Name:Luis E Patel  MRN: [id number]  Birthdate 1985  Date of evaluation: 3/17/25  PCP:  Antoinette Garay MD  Note Started: 4:30 PM EDT      CHIEF COMPLAINT       Chief Complaint   Patient presents with    Back Pain       HISTORY OF PRESENT ILLNESS  (Location/Symptom, Timing/Onset, Context/Setting, Quality, Duration, Modifying Factors, Severity.)      Luis E Patel is a 39 y.o. male who presents with acute on chronic back pain.  Patient states that he fell in 2015 and has had lower back pain since then.  He notes that it typically is worsened around the time of year of the initial fall.  Pain has been present for the past 3 days and did resolve before returning this episode.  He has not taken anything for his pain aside from Tylenol.  He denies any medical conditions, medications, allergies.  He does note midline pain but notes it is mostly in the lower right lumbar area.    PAST MEDICAL / SURGICAL / SOCIAL / FAMILY HISTORY      has no past medical history on file.       has no past surgical history on file.      Social History     Socioeconomic History    Marital status:      Spouse name: Not on file    Number of children: Not on file    Years of education: Not on file    Highest education level: Not on file   Occupational History    Not on file   Tobacco Use    Smoking status: Never    Smokeless tobacco: Never   Substance and Sexual Activity    Alcohol use: Never    Drug use: Never    Sexual activity: Not on file   Other Topics Concern    Not on file   Social History Narrative    Not on file     Social Drivers of Health     Financial Resource Strain: Low Risk  (5/23/2024)    Overall Financial Resource Strain (CARDIA)     Difficulty of Paying Living Expenses: Not hard at all   Food Insecurity: No Food Insecurity (5/23/2024)    Hunger Vital Sign     Worried About Running Out of Food in the Last Year: 
discharge. Left eye exhibits no discharge. No scleral icterus.   Neck: No JVD present. No tracheal deviation present.   Cardiovascular: pulses present in extremities  Pulmonary/Chest: Effort normal. No respiratory distress.   Musculoskeletal: Normal range of motion. exhibits no edema.   Neurological: they are alert and oriented to person, place, and time.  Skin: Skin is warm and dry.   Psychiatric: has a normal mood and affect.  behavior is normal.      Comments  Medical Decision Making      The patient presents with low back pain without signs of spinal cord compression, cauda equina syndrome, infection, aneurysm or other serious etiology. The patient is neurologically intact. Given the extremely low risk of these diagnoses further testing and evaluation for these possibilities does not appear to be indicated at this time. The patient has been instructed to return if the symptoms worsen or change in any way.        ED Course as of 03/17/25 1732   Mon Mar 17, 2025   1719 XR LUMBAR SPINE (2-3 VIEWS)  IMPRESSION:  No acute osseous abnormality identified. [KM]      ED Course User Index  [KM] Lavon Ramos MD William Krebs, DO, RDMS.  Attending Emergency Physician         Aj Cantor DO  03/17/25 1732

## 2025-03-17 NOTE — DISCHARGE INSTRUCTIONS
Rele lisa a oswa demen grace Antoinette Rodrigues MD nan 3-7 mil.    Sèvi ak yon pake glas oswa yon sak plen ak glas epi aplike nan zòn ki blese a 3 - 4 fwa pa mil grace 15 - 20 minit chak fwa. Si aksidan an gen plis pase 3 mil, Lè sa a, sèvi ak yon pad chofaj grace lucia detann misk yo nan do ou.    Sèvi ak ibipwofèn oswa Tylenol (sòf si yo preskri medikaman ki gen Tylenol haroldo l) grace doulè. Ou ka pran clement preskripsyon ibipwofèn (advil) tablèt (4 tablèt chak 8 èdtan oswa 3 tablèt chak 6 èdtan oswa 2 tablèt chak 4 èdtan)    Egzèsis Flexion Ed yo    1. Panche basen. Kouche krystle do ou ak jenou bese, kwaku plat krystle planche. Plati ti do ou krystle planche a, clement yo pa pouse desann ak janm yo. Kenbe grace 5 a 10 segonn.    2. Single jenou nan pwatrin. [place]ouche krystle do ou ak jenou bese ak kwaku plat krystle planche a. [place] rale jenou dwat ou nan direksyon zepòl ou epi kenbe 5 a 10 segonn. Bese jenou an epi repete ak lòt jenou an.    3. Double jenou nan pwatrin. Kòmanse tankou nan egzèsis anvan an. Apre rale jenou dwat nan pwatrin, rale jenou gòch nan pwatrin epi kenbe rosalino de jenou grace 5 a 10 segonn. [place] bese yon janm nan yon moman.    4. Pasyèl sit-up. Fè panche basen an (egzèsis 1) epi, pandan w ap kenbe pozisyon sa a, rosalino dousman pli tèt ou ak zepòl ou krystle planche a. Kenbe yon ti proctor. Retounen rosalino dousman nan pozisyon an kòmanse.    5. Detire andikape. Kòmanse nan sherita long ak zòtèy yo dirije nan direksyon plafon an ak jen olivia pwolonje. [place] bese kòf la pi hilary krystle janm yo, kenbe connor pwolonje, bra lonje krystle janm yo, ak lluvia richard.    6. Hanch Flexor detire. Mete yon kwaku hilary lòt ak [facility] gòaprul (hilary) flechi epi [facility] dwat (dèyè) hayley dwat dwat. Flex pi hilary nan kòf la jiskaske [facility] alyce la kontakte pli axillary (rejyon twou bra). Repete ak janm dwat hilary ak janmagdy biggs tounen.    7. Koupi. Kanpe ak rosalino de kwaku paralèl, krystle lajè zepòl la apa. Eseye hayley kòf la kòm pèpandikilè posib ak bo lalluvia

## 2025-03-17 NOTE — ED TRIAGE NOTES
Pt to ED via triage a/o x4 with c/o lower back pain.   Pt reports a fall in 2015 and stated when it is getting close to the date of the fall the patient reports he has pain.   Pt denies any new injury.   Pt reports he has had pain since Friday.   Pt denies any weakness in bilateral legs.   Pt is able to ambulate but has pain.     Pt reports tylenol this morning.   Pt VSS, NAD noted.   All information was obtained via  provided by [facility]